# Patient Record
Sex: MALE | Race: WHITE | NOT HISPANIC OR LATINO | Employment: UNEMPLOYED | URBAN - METROPOLITAN AREA
[De-identification: names, ages, dates, MRNs, and addresses within clinical notes are randomized per-mention and may not be internally consistent; named-entity substitution may affect disease eponyms.]

---

## 2021-05-04 ENCOUNTER — HOSPITAL ENCOUNTER (EMERGENCY)
Facility: CLINIC | Age: 21
Discharge: HOME | End: 2021-05-05
Attending: EMERGENCY MEDICINE | Admitting: EMERGENCY MEDICINE
Payer: MEDICAID

## 2021-05-04 DIAGNOSIS — R40.4 SPELL OF ALTERED CONSCIOUSNESS: ICD-10-CM

## 2021-05-04 PROCEDURE — 99283 EMERGENCY DEPT VISIT LOW MDM: CPT | Mod: 25 | Performed by: EMERGENCY MEDICINE

## 2021-05-04 PROCEDURE — 96361 HYDRATE IV INFUSION ADD-ON: CPT | Performed by: EMERGENCY MEDICINE

## 2021-05-04 PROCEDURE — 96360 HYDRATION IV INFUSION INIT: CPT | Performed by: EMERGENCY MEDICINE

## 2021-05-04 PROCEDURE — 99285 EMERGENCY DEPT VISIT HI MDM: CPT | Performed by: EMERGENCY MEDICINE

## 2021-05-04 RX ORDER — TRAZODONE HYDROCHLORIDE 50 MG/1
50-100 TABLET, FILM COATED ORAL
COMMUNITY
Start: 2021-04-27 | End: 2022-04-27

## 2021-05-04 RX ORDER — OXYCODONE AND ACETAMINOPHEN 5; 325 MG/1; MG/1
1-2 TABLET ORAL
COMMUNITY
Start: 2021-04-28

## 2021-05-04 RX ORDER — MECLIZINE HCL 12.5 MG 12.5 MG/1
25 TABLET ORAL
COMMUNITY
Start: 2021-04-22

## 2021-05-04 RX ORDER — ACETAMINOPHEN 500 MG
1000 TABLET ORAL ONCE
Status: COMPLETED | OUTPATIENT
Start: 2021-05-05 | End: 2021-05-05

## 2021-05-04 SDOH — HEALTH STABILITY: MENTAL HEALTH: HOW OFTEN DO YOU HAVE A DRINK CONTAINING ALCOHOL?: NEVER

## 2021-05-04 ASSESSMENT — ENCOUNTER SYMPTOMS
TREMORS: 1
NAUSEA: 0
SEIZURES: 1
VOMITING: 0
FEVER: 0
CHILLS: 0
ABDOMINAL PAIN: 0

## 2021-05-05 VITALS
TEMPERATURE: 97.9 F | HEART RATE: 80 BPM | OXYGEN SATURATION: 97 % | SYSTOLIC BLOOD PRESSURE: 142 MMHG | DIASTOLIC BLOOD PRESSURE: 90 MMHG | RESPIRATION RATE: 18 BRPM

## 2021-05-05 LAB
ALBUMIN SERPL-MCNC: 4.2 G/DL (ref 3.4–5)
ALP SERPL-CCNC: 169 U/L (ref 40–150)
ALT SERPL W P-5'-P-CCNC: 35 U/L (ref 0–70)
ANION GAP SERPL CALCULATED.3IONS-SCNC: 12 MMOL/L (ref 3–14)
AST SERPL W P-5'-P-CCNC: 31 U/L (ref 0–45)
BASOPHILS # BLD AUTO: 0.1 10E9/L (ref 0–0.2)
BASOPHILS NFR BLD AUTO: 0.5 %
BILIRUB SERPL-MCNC: 0.6 MG/DL (ref 0.2–1.3)
BUN SERPL-MCNC: 10 MG/DL (ref 7–30)
CALCIUM SERPL-MCNC: 9.4 MG/DL (ref 8.5–10.1)
CHLORIDE SERPL-SCNC: 106 MMOL/L (ref 94–109)
CO2 SERPL-SCNC: 18 MMOL/L (ref 20–32)
CREAT SERPL-MCNC: 1.07 MG/DL (ref 0.66–1.25)
DIFFERENTIAL METHOD BLD: ABNORMAL
EOSINOPHIL # BLD AUTO: 0.1 10E9/L (ref 0–0.7)
EOSINOPHIL NFR BLD AUTO: 0.8 %
ERYTHROCYTE [DISTWIDTH] IN BLOOD BY AUTOMATED COUNT: 13.5 % (ref 10–15)
GFR SERPL CREATININE-BSD FRML MDRD: >90 ML/MIN/{1.73_M2}
GLUCOSE SERPL-MCNC: 113 MG/DL (ref 70–99)
HCT VFR BLD AUTO: 45.4 % (ref 40–53)
HGB BLD-MCNC: 14.9 G/DL (ref 13.3–17.7)
IMM GRANULOCYTES # BLD: 0.1 10E9/L (ref 0–0.4)
IMM GRANULOCYTES NFR BLD: 0.8 %
LACTATE BLD-SCNC: 0.6 MMOL/L (ref 0.7–2)
LACTATE BLD-SCNC: 6.3 MMOL/L (ref 0.7–2)
LYMPHOCYTES # BLD AUTO: 3.1 10E9/L (ref 0.8–5.3)
LYMPHOCYTES NFR BLD AUTO: 23.2 %
MCH RBC QN AUTO: 28.4 PG (ref 26.5–33)
MCHC RBC AUTO-ENTMCNC: 32.8 G/DL (ref 31.5–36.5)
MCV RBC AUTO: 87 FL (ref 78–100)
MONOCYTES # BLD AUTO: 0.8 10E9/L (ref 0–1.3)
MONOCYTES NFR BLD AUTO: 6.1 %
NEUTROPHILS # BLD AUTO: 9.2 10E9/L (ref 1.6–8.3)
NEUTROPHILS NFR BLD AUTO: 68.6 %
NRBC # BLD AUTO: 0 10*3/UL
NRBC BLD AUTO-RTO: 0 /100
PLATELET # BLD AUTO: 328 10E9/L (ref 150–450)
POTASSIUM SERPL-SCNC: 3.9 MMOL/L (ref 3.4–5.3)
PROT SERPL-MCNC: 7.7 G/DL (ref 6.8–8.8)
RBC # BLD AUTO: 5.24 10E12/L (ref 4.4–5.9)
SODIUM SERPL-SCNC: 136 MMOL/L (ref 133–144)
WBC # BLD AUTO: 13.4 10E9/L (ref 4–11)

## 2021-05-05 PROCEDURE — 258N000003 HC RX IP 258 OP 636: Performed by: EMERGENCY MEDICINE

## 2021-05-05 PROCEDURE — 250N000013 HC RX MED GY IP 250 OP 250 PS 637: Performed by: EMERGENCY MEDICINE

## 2021-05-05 PROCEDURE — 85025 COMPLETE CBC W/AUTO DIFF WBC: CPT | Performed by: EMERGENCY MEDICINE

## 2021-05-05 PROCEDURE — 83605 ASSAY OF LACTIC ACID: CPT | Performed by: EMERGENCY MEDICINE

## 2021-05-05 PROCEDURE — 99207 PR SATISFY VISIT NUMBER: CPT | Performed by: PSYCHIATRY & NEUROLOGY

## 2021-05-05 PROCEDURE — 80053 COMPREHEN METABOLIC PANEL: CPT | Performed by: EMERGENCY MEDICINE

## 2021-05-05 RX ORDER — OXYCODONE HYDROCHLORIDE 5 MG/1
5 TABLET ORAL ONCE
Status: COMPLETED | OUTPATIENT
Start: 2021-05-05 | End: 2021-05-05

## 2021-05-05 RX ADMIN — ACETAMINOPHEN 1000 MG: 500 TABLET, FILM COATED ORAL at 00:07

## 2021-05-05 RX ADMIN — SODIUM CHLORIDE 1000 ML: 9 INJECTION, SOLUTION INTRAVENOUS at 00:50

## 2021-05-05 RX ADMIN — OXYCODONE HYDROCHLORIDE 5 MG: 5 TABLET ORAL at 00:56

## 2021-05-05 RX ADMIN — SODIUM CHLORIDE 1000 ML: 9 INJECTION, SOLUTION INTRAVENOUS at 02:03

## 2021-05-05 ASSESSMENT — ENCOUNTER SYMPTOMS
BACK PAIN: 1
COUGH: 0
DYSURIA: 0
BRUISES/BLEEDS EASILY: 0
CONFUSION: 0
RHINORRHEA: 0
SHORTNESS OF BREATH: 0

## 2021-05-05 NOTE — ED NOTES
Emergency Department Patient Sign-out       Brief HPI:  This is a 21 year old male signed out to me by Dr. Rueda .  See initial ED Provider note for details of the presentation.            Significant Events prior to my assuming care: Patient having spells. Worked up at [facility] diagnosed with PNES. They do not believe other neurologist. Neuro to see. Lactic elevated, plan to recheck after fluids.      Exam:   Patient Vitals for the past 24 hrs:   BP Temp Temp src Pulse Resp SpO2   05/05/21 0131 -- -- -- -- -- 94 %   05/05/21 0130 -- -- -- -- -- 94 %   05/05/21 0129 -- -- -- -- -- 93 %   05/05/21 0128 -- -- -- -- -- 92 %   05/05/21 0127 -- -- -- -- -- 97 %   05/05/21 0126 -- -- -- -- -- 95 %   05/05/21 0027 -- -- -- -- -- 95 %   05/05/21 0026 -- -- -- -- -- 91 %   05/05/21 0021 -- -- -- -- -- 97 %   05/04/21 2238 -- -- -- -- -- 97 %   05/04/21 2237 -- -- -- -- -- 99 %   05/04/21 2236 -- -- -- -- -- 98 %   05/04/21 2232 -- -- -- -- -- 97 %   05/04/21 2231 -- -- -- -- -- 96 %   05/04/21 2230 (!) 149/105 -- -- 85 -- --   05/04/21 2143 (!) 153/101 -- -- -- -- --   05/04/21 2139 (!) 138/91 97.9  F (36.6  C) Oral 88 18 98 %           ED RESULTS:   Results for orders placed or performed during the hospital encounter of 05/04/21 (from the past 24 hour(s))   CBC with platelets differential     Status: Abnormal (In process)    Collection Time: 05/05/21 12:16 AM   Result Value Ref Range    WBC 13.4 (H) 4.0 - 11.0 10e9/L    RBC Count 5.24 4.4 - 5.9 10e12/L    Hemoglobin 14.9 13.3 - 17.7 g/dL    Hematocrit 45.4 40.0 - 53.0 %    MCV 87 78 - 100 fl    MCH 28.4 26.5 - 33.0 pg    MCHC 32.8 31.5 - 36.5 g/dL    RDW 13.5 10.0 - 15.0 %    Platelet Count 328 150 - 450 10e9/L    Diff Method PENDING     % Neutrophils 68.6 %    % Lymphocytes 23.2 %    % Monocytes 6.1 %    % Eosinophils 0.8 %    % Basophils 0.5 %    % Immature Granulocytes 0.8 %    Nucleated RBCs 0 0 /100    Absolute Neutrophil 9.2 (H) 1.6 - 8.3 10e9/L     Absolute Lymphocytes 3.1 0.8 - 5.3 10e9/L    Absolute Monocytes 0.8 0.0 - 1.3 10e9/L    Absolute Eosinophils 0.1 0.0 - 0.7 10e9/L    Absolute Basophils 0.1 0.0 - 0.2 10e9/L    Abs Immature Granulocytes 0.1 0 - 0.4 10e9/L    Absolute Nucleated RBC 0.0    Comprehensive metabolic panel     Status: Abnormal    Collection Time: 05/05/21 12:16 AM   Result Value Ref Range    Sodium 136 133 - 144 mmol/L    Potassium 3.9 3.4 - 5.3 mmol/L    Chloride 106 94 - 109 mmol/L    Carbon Dioxide 18 (L) 20 - 32 mmol/L    Anion Gap 12 3 - 14 mmol/L    Glucose 113 (H) 70 - 99 mg/dL    Urea Nitrogen 10 7 - 30 mg/dL    Creatinine 1.07 0.66 - 1.25 mg/dL    GFR Estimate >90 >60 mL/min/[1.73_m2]    GFR Estimate If Black >90 >60 mL/min/[1.73_m2]    Calcium 9.4 8.5 - 10.1 mg/dL    Bilirubin Total 0.6 0.2 - 1.3 mg/dL    Albumin 4.2 3.4 - 5.0 g/dL    Protein Total 7.7 6.8 - 8.8 g/dL    Alkaline Phosphatase 169 (H) 40 - 150 U/L    ALT 35 0 - 70 U/L    AST 31 0 - 45 U/L   Lactic acid whole blood     Status: Abnormal    Collection Time: 05/05/21 12:16 AM   Result Value Ref Range    Lactic Acid 6.3 (HH) 0.7 - 2.0 mmol/L       ED MEDICATIONS:   Medications   acetaminophen (TYLENOL) tablet 1,000 mg (1,000 mg Oral Given 5/5/21 0007)   0.9% sodium chloride BOLUS (1,000 mLs Intravenous New Bag 5/5/21 0050)   oxyCODONE (ROXICODONE) tablet 5 mg (5 mg Oral Given 5/5/21 0056)         Impression:  No diagnosis found.    Plan:    Neurology did see the patient.  They agree that this is nonepileptic spells.  He does have follow-up with psychiatry scheduled.  Lactic repeat normal. Patient will be discharged with family.        MD Kev Hand Matthew Joseph, MD  05/05/21 9765

## 2021-05-05 NOTE — CONSULTS
"[facility]  Neurology Consultation    Patient Name:  Raul Olivas  MRN:  [id number]    :  2000  Date of Service:  May 5, 2021  Primary care provider:  Sandra Gilbert      Neurology consultation service was asked to see Raul Olivas by Dr. Rueda to evaluate for spells.    History of Present Illness:     Mr Olivas is a 21-year-old man who has been having convulsive spells for about the past 2 weeks.  He presents to the emergency department today after he and his father describe that these spells are getting worse in terms of frequency and intensity.  The spells are described as full body shaking of the arms and legs and these can last from seconds to several minutes.  At times he has been able to communicate during these episodes, at other times he is able to hear people speaking but feels that he is not able to respond verbally since he is concentrating on breathing and sometimes concentrating on holding himself upright.  Following these spells he returns to his baseline in seconds, and the longest return to baseline has been 10 minutes.  2 of the spells today were associated with a sensation on his back-when he attempted to take a shower and felt warm water on his back he started to fall down and when attempting to hold on to railings in the shower, he was unable to maintain strength.  Later in the day, when his fiancée was attempting to rub his back with a salve he collapsed when she touched his back and felt that he was too weak to get up from the floor.  The day after his discharge from the hospital, when he was attempting to sleep he experienced the shaking episodes \"63 times\" between the evening and the morning.    He has recently been evaluated at [facility] for the spells, and in fact, when he had one of these in the emergency department he was intubated and admitted to the ICU.  He was monitored with EEG that captured 2 of the spells that did " not have electrographic correlation.  EEG monitoring was discontinued and his antiepileptics were also discontinued.  A psych consult was placed for further exploration of stressors that could lead to nonepileptic spells.  During the psych evaluation he described that he had recently been fired from a job after he was accused of being the owner of a pipe used to smoke methamphetamine that was found in the toilet.  He felt he was falsely accused which made him very upset, and he has been out of work for the past couple of weeks.  He also has concerns about finances, caring for his fiancé, and the prospect of not being a financial provider during their marriage.    He has no prior history of spells such as this, no history of febrile seizures, no family history suggestive of seizures, and no head injuries.    ROS  A 10-point ROS was performed as per HPI. Pertinent negatives and positives are included above    PMH  History reviewed. No pertinent past medical history.  History reviewed. No pertinent surgical history.    Medications   (Not in a hospital admission)  Not taking meclizine that was recently prescribed  Not taking Flexeril that was recently prescribed  Has 1 remaining oxycodone tablet for back pain    Allergies  Allergies   Allergen Reactions     Bee Venom Swelling     Amoxicillin Hives and Rash       Social History  Social History     Tobacco Use     Smoking status: Never Smoker     Smokeless tobacco: Never Used   Substance Use Topics     Alcohol use: Never     Frequency: Never       Family History    History reviewed. No pertinent family history.      Physical Examination   Vitals: BP (!) 149/105   Pulse 85   Temp 97.9  F (36.6  C) (Oral)   Resp 18   SpO2 97%   General: Adult patient, lying in bed, NAD  HEENT: NC/AT, no icterus, op pink and moist  Cardiac: RRR  Chest: non-labored on RA  Abdomen: S/NT/ND  Extremities: Warm, no edema  Skin: No rash or lesion   Psych: Mood pleasant, affect  congruent  Neuro:  Mental status: Awake, alert, attentive, oriented to self, time, place, and circumstance. Language is fluent and coherent with intact comprehension of complex commands, naming and repetition.  Cranial nerves: VFF, PERRL, conjugate gaze, EOMI, facial sensation intact, face symmetric, shoulder shrug strong, tongue/uvula midline, no dysarthria.   Motor: Normal bulk and tone. No abnormal movements. 5/5 strength in 4/4 extremities.   Reflexes: 2+ reflexes and symmetric biceps, brachioradialis,  patellae, and achilles. Negative Goldberg, no clonus  Sensory: Intact to light touch throughout  Coordination: FNF without ataxia or dysmetria. Rapid alternating movements intact.   Gait: Normal width, stride length, turn, and arm swing. Station normal. Heel, toe, and tandem walk requires occasionally holding onto something for support.    Investigations   MRI brain from 4/23/2021  IMPRESSION:  1.  No convincing evidence of epileptogenic focus demonstrated.  2.  No acute intracranial process.    Impression  Mr Olivas is a 21-year-old man who has been experiencing generalized convulsions for the past 2 weeks that have already been convincingly diagnosed as nonepileptic spells.  2 of these were captured during his admission at [facility] which did not have electrographic correlation, with the patient and his fiancée confirming that these were targeted events.  The history alone is most suggestive of a nonepileptic spell given that these are bilateral, he has maintained consciousness during the spells, and has a very brief post ictal phase despite sometimes minutes of convulsing.  Further, he is able to endorse recent stressors over the same time that likely explain his new reaction to the stress.     His elevated lactate here is most likely secondary to his frequent bouts of shaking throughout the day leading to a decent amount of anaerobic muscle metabolism.  He has not had 1 of these spells for several  hours, so I did not recommend a prolactin: it would not differentiate between a nonepileptic spell after a period greater than 30 minutes.  He has not had other illnesses or other signs that would suggest systemic illness as a cause for the lactic acidosis.    I spoke with the patient, his father, and his fiancée at length regarding the body's response to stress.  They were appreciative of this description and did not resist this diagnosis.  This is a very difficult disorder to experience, and can be resistant to therapies.  I did express my support for cognitive behavioral therapy, explaining he will be able to retrain his patterns of thought, and that this is a much more robust therapy when compared to taking an as needed medication.  I also explained that his psychiatrist might be in favor of prescribing an antidepressant which also can be helpful in conjunction with therapy.    #Nonepileptic spells    Recommendations  -Maintain psychiatry consult this coming Friday  -Recommended continuity with psychiatry as well as psychology for talk therapy, especially cognitive behavioral therapy  -In agreement with patient's own assessment to abstain from driving a vehicle until these spells are under control.  Also recommend against other activities that could harm himself or others if he were to suddenly lose control of his body, and this includes swimming, climbing ladders or other heights, working with power tools, etc.  -If lactate has normalized and patient continues to have the same nonfocal neurologic exam, okay to discharge to home    Thank you for involving Neurology in the care of Raul Olivas.  Please do not hesitate to call with questions/concerns (consult pager [phone]).      Patient was discussed with Dr. Sharif.    Luis Mukherjee MD  Neurology PGY-4

## 2021-05-05 NOTE — ED NOTES
"Pt. reports he would like a sitter at the bedside with him when his father is not present because during his seizures he states he pulls himself out of the bed and falls on the floor. [facility] to [facility] with a feeding tube. ERT Broderick present at bedside but patient refused since father returned to bedside. Pt. And father educated that there is no quick fix for seizures and that it is a long process and during seizures we provide supportive care to keep the patient safe. Father states \" he needs to go to a room to be sedated and sleep\". Offered to turn down lights for patient to rest and patient refused stating that he would fall asleep and then have a big one. Lights left on.   "

## 2021-05-05 NOTE — ED NOTES
Pt. Called out over system stating that he had a full body seizure. When author entered the room, patient alert, sitting up in bed, oriented, moving all 4 extremities to reposition self. AVSS on RA. Pt. Reports it lasted 10 seconds and that he just woke up. Family member in room reports patient's whole body was shaking for 10 mins.

## 2021-05-05 NOTE — ED NOTES
Pt. Reports a full body seizure again. Witnessed by staff. Pt. Appeared to have wide eyes, breathing, and father holding patient down. Father reports patient tries to stand and pull himself off the bed during seizures.

## 2021-05-05 NOTE — ED TRIAGE NOTES
"Pt arrives to ED with episodes of seizures and states that he has been collapsing while standing when touched on a particular spot on his back. Pt had MRI this morning with [facility] this morning. T4-7 fracture after having a seizure approx April 24. Pt has had recent admission for similar problems, and feels \"that they haven't be able to get any answers.\" The decided to come to ED for a more thorough neuro work-up because it is \"getting worse.\"  "

## 2021-05-05 NOTE — ED PROVIDER NOTES
"ED Provider Note  [facility]      History     Chief Complaint   Patient presents with     Neurologic Problem     HPI  Raul Olivas is a 21 year old male with a PMH of pseudoseizure, RAD and ADD who presents to the ED today complaining of a neurological problem.  Patient presents to the ED today with his father.  Patient states he has been having \"sessions\" for the past 2 weeks.  Patient's father states that these \"sessions\" are seizures.  Patient states he recently fractured his spine in 3 places during one of these episodes.  Patient states that during these episodes his whole body stiffens up and he falls to the ground shaking.  He states he was seen previously at [facility] as well as other hospitals for this issue.  Patient reports that today he was standing in his living room when he fell to the ground shaking.  He states he has multiple episodes throughout the day.  He states he can feel them coming on, stating that \"I feel like I am going down\".  Father states that after a brain scan was done at [facility] he was told the issue is not with his brain.  He also was told by someone else that it is vertigo.  He was not put on any antiseizure medications and was not set up to follow-up with neurology.  Patient endorses sleep disturbance from these episodes.  He states he has not slept in 2 weeks.  Patient states he currently lives with his fiancée.  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  Patient states has been eating and drinking normally, but states that he gets \"shaky\" after eating or drinking.  He also states that his chest and back are \"on fire\" during these episodes.  He states he previously bit his lip during one of these episodes.  Patient states he smokes marijuana with his last use being 2 weeks ago.  He also endorses occasional alcohol use with his last drink being " "2 weeks ago.  Patient denies fever, chills, nausea, vomiting, abdominal pain, or history of anxiety, depression or PTSD.     Past Medical History  History reviewed. No pertinent past medical history.  History reviewed. No pertinent surgical history.  oxyCODONE-acetaminophen (PERCOCET) 5-325 MG tablet  traZODone (DESYREL) 50 MG tablet  meclizine (ANTIVERT) 12.5 MG tablet      Allergies   Allergen Reactions     Bee Venom Swelling     Amoxicillin Hives and Rash     Family History  History reviewed. No pertinent family history.  Social History   Social History     Tobacco Use     Smoking status: Never Smoker     Smokeless tobacco: Never Used   Substance Use Topics     Alcohol use: Never     Frequency: Never     Drug use: Yes     Types: Marijuana      Past medical history, past surgical history, medications, allergies, family history, and social history were reviewed with the patient. No additional pertinent items.       Review of Systems   Constitutional: Negative for chills and fever.   HENT: Negative for rhinorrhea.    Eyes: Negative for visual disturbance.   Respiratory: Negative for cough and shortness of breath.    Cardiovascular: Positive for chest pain (\"on fire\" during episodes).   Gastrointestinal: Negative for abdominal pain, nausea and vomiting.   Endocrine: Negative for polyuria.   Genitourinary: Negative for dysuria.   Musculoskeletal: Positive for back pain (\"on fire\" during episodes); known thoracic fracture.   Skin: Negative for rash.   Allergic/Immunologic: Negative for immunocompromised state.   Neurological: Positive for tremors and seizures (pseudo).   Hematological: Does not bruise/bleed easily.   Psychiatric/Behavioral: Negative for confusion.   All other systems reviewed and are negative.      Physical Exam   BP: (!) 138/91  Pulse: 88  Temp: 97.9  F (36.6  C)  Resp: 18  SpO2: 98 %  Physical Exam  General: Afebrile, mild distress  HEENT: Normocephalic, atraumatic, conjunctivae normal. No tongue " lacerations MMM  Neck: non-tender, supple  Cardio: regular rate. regular rhythm   Resp: Normal work of breathing, no respiratory distress, lungs clear bilaterally, no wheezing, rhonchi, rales  Chest/Back: no visual signs of trauma, no CVA tenderness, +thoracic tenderness to palpation, patient is spinal brace for known thoracic fracture    Abdomen: soft, non distension, no tenderness, no peritoneal signs   Neuro: alert and fully oriented. CN II-XII intact. No focal deficits, normal strength and sensation in all extremities. Normal gait   MSK: no deformities. Normal range of motion  Integumentary/Skin: no rash visualized, normal color  Psych: normal affect, normal behavior    ED Course     11:35 PM  The patient was seen and examined by Lani Rueda MD in Room ED05.     Procedures  Results for orders placed or performed during the hospital encounter of 05/04/21   CBC with platelets differential     Status: Abnormal (In process)   Result Value Ref Range    WBC 13.4 (H) 4.0 - 11.0 10e9/L    RBC Count 5.24 4.4 - 5.9 10e12/L    Hemoglobin 14.9 13.3 - 17.7 g/dL    Hematocrit 45.4 40.0 - 53.0 %    MCV 87 78 - 100 fl    MCH 28.4 26.5 - 33.0 pg    MCHC 32.8 31.5 - 36.5 g/dL    RDW 13.5 10.0 - 15.0 %    Platelet Count 328 150 - 450 10e9/L    Diff Method PENDING     % Neutrophils 68.6 %    % Lymphocytes 23.2 %    % Monocytes 6.1 %    % Eosinophils 0.8 %    % Basophils 0.5 %    % Immature Granulocytes 0.8 %    Nucleated RBCs 0 0 /100    Absolute Neutrophil 9.2 (H) 1.6 - 8.3 10e9/L    Absolute Lymphocytes 3.1 0.8 - 5.3 10e9/L    Absolute Monocytes 0.8 0.0 - 1.3 10e9/L    Absolute Eosinophils 0.1 0.0 - 0.7 10e9/L    Absolute Basophils 0.1 0.0 - 0.2 10e9/L    Abs Immature Granulocytes 0.1 0 - 0.4 10e9/L    Absolute Nucleated RBC 0.0    Comprehensive metabolic panel     Status: Abnormal   Result Value Ref Range    Sodium 136 133 - 144 mmol/L    Potassium 3.9 3.4 - 5.3 mmol/L    Chloride 106 94 - 109 mmol/L    Carbon Dioxide 18  (L) 20 - 32 mmol/L    Anion Gap 12 3 - 14 mmol/L    Glucose 113 (H) 70 - 99 mg/dL    Urea Nitrogen 10 7 - 30 mg/dL    Creatinine 1.07 0.66 - 1.25 mg/dL    GFR Estimate >90 >60 mL/min/[1.73_m2]    GFR Estimate If Black >90 >60 mL/min/[1.73_m2]    Calcium 9.4 8.5 - 10.1 mg/dL    Bilirubin Total 0.6 0.2 - 1.3 mg/dL    Albumin 4.2 3.4 - 5.0 g/dL    Protein Total 7.7 6.8 - 8.8 g/dL    Alkaline Phosphatase 169 (H) 40 - 150 U/L    ALT 35 0 - 70 U/L    AST 31 0 - 45 U/L   Lactic acid whole blood     Status: Abnormal   Result Value Ref Range    Lactic Acid 6.3 (HH) 0.7 - 2.0 mmol/L     Medications   0.9% sodium chloride BOLUS (1,000 mLs Intravenous New Bag 5/5/21 0050)   acetaminophen (TYLENOL) tablet 1,000 mg (1,000 mg Oral Given 5/5/21 0007)   oxyCODONE (ROXICODONE) tablet 5 mg (5 mg Oral Given 5/5/21 0056)        Assessments & Plan (with Medical Decision Making)   Raul Olivas is a 21 year old male with a PMH of pseudoseizure, RAD and ADD who presents to the ED today complaining of a neurological problem - recurrent spells over the past few weeks.  Upon arrival patient is well-appearing, afebrile, mild distress.  Patient is nontoxic-appearing, patient with recurrent episodes of spells in which he tightens his body, during these episodes patient is awake, alert, no loss of consciousness.  Patient has been evaluated at [facility] and has been evaluated by neurology.  Patient was diagnosed with nonepileptic pseudoseizures.  Family requesting another opinion.  At this time will obtain comprehensive labs, and have neurology evaluate the patient.    I reviewed comprehensive labs which are remarkable for leukocytosis with white blood cell count of 13.4, hemoglobin 14.9, no acute electrolyte abnormality, carbon dioxide 18, alkaline phosphatase mildly elevated 169, lactic acid elevated 6.3.  Patient is afebrile, no signs of acute infection, and does not appear to be septic.  Lactic acid likely related to  recurrent episodes of intense body tightening.  Will hydrate with 1 L normal saline IV fluid bolus, and repeat lactic acid.     Patient signed out to overnight provider pending repeat lactic, neurology consult/recommendations.      I have reviewed the nursing notes. I have reviewed the findings, diagnosis, plan and need for follow up with the patient.    New Prescriptions    No medications on file       Final diagnoses:   None   IDouglas, am serving as a trained medical scribe to document services personally performed by Lani Rueda MD, based on the provider's statements to me.     I, Lani Rueda MD, was physically present and have reviewed and verified the accuracy of this note documented by Douglas Varela.      --  Lani Rueda MD  [facility] EMERGENCY DEPARTMENT  5/4/2021     Lani Rudea MD  05/05/21 0145

## 2021-05-05 NOTE — DISCHARGE INSTRUCTIONS
Follow-up with psychiatry as you have planned.    Return to the emergency department if you have any further concerns.